# Patient Record
Sex: MALE | Race: WHITE | Employment: UNEMPLOYED | URBAN - METROPOLITAN AREA
[De-identification: names, ages, dates, MRNs, and addresses within clinical notes are randomized per-mention and may not be internally consistent; named-entity substitution may affect disease eponyms.]

---

## 2025-04-16 ENCOUNTER — APPOINTMENT (OUTPATIENT)
Dept: CARDIOLOGY | Facility: HOSPITAL | Age: 25
End: 2025-04-16
Payer: COMMERCIAL

## 2025-04-16 ENCOUNTER — HOSPITAL ENCOUNTER (EMERGENCY)
Dept: CARDIOLOGY | Facility: HOSPITAL | Age: 25
Discharge: HOME | End: 2025-04-16
Payer: COMMERCIAL

## 2025-04-16 ENCOUNTER — HOSPITAL ENCOUNTER (EMERGENCY)
Facility: HOSPITAL | Age: 25
Discharge: HOME | End: 2025-04-16
Attending: STUDENT IN AN ORGANIZED HEALTH CARE EDUCATION/TRAINING PROGRAM
Payer: COMMERCIAL

## 2025-04-16 VITALS
BODY MASS INDEX: 23.7 KG/M2 | HEIGHT: 69 IN | RESPIRATION RATE: 18 BRPM | HEART RATE: 80 BPM | TEMPERATURE: 96.8 F | SYSTOLIC BLOOD PRESSURE: 114 MMHG | DIASTOLIC BLOOD PRESSURE: 68 MMHG | WEIGHT: 160 LBS | OXYGEN SATURATION: 95 %

## 2025-04-16 DIAGNOSIS — G40.919 BREAKTHROUGH SEIZURE (MULTI): ICD-10-CM

## 2025-04-16 DIAGNOSIS — G40.919 BREAKTHROUGH SEIZURE (MULTI): Primary | ICD-10-CM

## 2025-04-16 LAB
ALBUMIN SERPL BCP-MCNC: 4.5 G/DL (ref 3.4–5)
ALP SERPL-CCNC: 68 U/L (ref 33–120)
ALT SERPL W P-5'-P-CCNC: 21 U/L (ref 10–52)
ANION GAP SERPL CALC-SCNC: 13 MMOL/L (ref 10–20)
AST SERPL W P-5'-P-CCNC: 20 U/L (ref 9–39)
BASOPHILS # BLD AUTO: 0.07 X10*3/UL (ref 0–0.1)
BASOPHILS NFR BLD AUTO: 1.1 %
BILIRUB SERPL-MCNC: 0.3 MG/DL (ref 0–1.2)
BUN SERPL-MCNC: 19 MG/DL (ref 6–23)
CALCIUM SERPL-MCNC: 9.1 MG/DL (ref 8.6–10.3)
CHLORIDE SERPL-SCNC: 109 MMOL/L (ref 98–107)
CO2 SERPL-SCNC: 19 MMOL/L (ref 21–32)
CREAT SERPL-MCNC: 1.04 MG/DL (ref 0.5–1.3)
EGFRCR SERPLBLD CKD-EPI 2021: >90 ML/MIN/1.73M*2
EOSINOPHIL # BLD AUTO: 0.35 X10*3/UL (ref 0–0.7)
EOSINOPHIL NFR BLD AUTO: 5.4 %
ERYTHROCYTE [DISTWIDTH] IN BLOOD BY AUTOMATED COUNT: 12.6 % (ref 11.5–14.5)
GLUCOSE SERPL-MCNC: 86 MG/DL (ref 74–99)
HCT VFR BLD AUTO: 42.8 % (ref 41–52)
HGB BLD-MCNC: 14 G/DL (ref 13.5–17.5)
IMM GRANULOCYTES # BLD AUTO: 0.04 X10*3/UL (ref 0–0.7)
IMM GRANULOCYTES NFR BLD AUTO: 0.6 % (ref 0–0.9)
LYMPHOCYTES # BLD AUTO: 2.09 X10*3/UL (ref 1.2–4.8)
LYMPHOCYTES NFR BLD AUTO: 32.1 %
MAGNESIUM SERPL-MCNC: 2.25 MG/DL (ref 1.6–2.4)
MCH RBC QN AUTO: 32.1 PG (ref 26–34)
MCHC RBC AUTO-ENTMCNC: 32.7 G/DL (ref 32–36)
MCV RBC AUTO: 98 FL (ref 80–100)
MONOCYTES # BLD AUTO: 0.76 X10*3/UL (ref 0.1–1)
MONOCYTES NFR BLD AUTO: 11.7 %
NEUTROPHILS # BLD AUTO: 3.2 X10*3/UL (ref 1.2–7.7)
NEUTROPHILS NFR BLD AUTO: 49.1 %
NRBC BLD-RTO: 0 /100 WBCS (ref 0–0)
PLATELET # BLD AUTO: 257 X10*3/UL (ref 150–450)
POTASSIUM SERPL-SCNC: 4.3 MMOL/L (ref 3.5–5.3)
PROT SERPL-MCNC: 6.7 G/DL (ref 6.4–8.2)
RBC # BLD AUTO: 4.36 X10*6/UL (ref 4.5–5.9)
SODIUM SERPL-SCNC: 137 MMOL/L (ref 136–145)
WBC # BLD AUTO: 6.5 X10*3/UL (ref 4.4–11.3)

## 2025-04-16 PROCEDURE — 2500000004 HC RX 250 GENERAL PHARMACY W/ HCPCS (ALT 636 FOR OP/ED): Mod: JZ | Performed by: STUDENT IN AN ORGANIZED HEALTH CARE EDUCATION/TRAINING PROGRAM

## 2025-04-16 PROCEDURE — 93005 ELECTROCARDIOGRAM TRACING: CPT

## 2025-04-16 PROCEDURE — 83735 ASSAY OF MAGNESIUM: CPT | Performed by: STUDENT IN AN ORGANIZED HEALTH CARE EDUCATION/TRAINING PROGRAM

## 2025-04-16 PROCEDURE — 36415 COLL VENOUS BLD VENIPUNCTURE: CPT | Performed by: STUDENT IN AN ORGANIZED HEALTH CARE EDUCATION/TRAINING PROGRAM

## 2025-04-16 PROCEDURE — 96360 HYDRATION IV INFUSION INIT: CPT

## 2025-04-16 PROCEDURE — 99284 EMERGENCY DEPT VISIT MOD MDM: CPT | Mod: 25 | Performed by: STUDENT IN AN ORGANIZED HEALTH CARE EDUCATION/TRAINING PROGRAM

## 2025-04-16 PROCEDURE — 85025 COMPLETE CBC W/AUTO DIFF WBC: CPT | Performed by: STUDENT IN AN ORGANIZED HEALTH CARE EDUCATION/TRAINING PROGRAM

## 2025-04-16 PROCEDURE — 80053 COMPREHEN METABOLIC PANEL: CPT | Performed by: STUDENT IN AN ORGANIZED HEALTH CARE EDUCATION/TRAINING PROGRAM

## 2025-04-16 RX ADMIN — SODIUM CHLORIDE 1000 ML: 0.9 INJECTION, SOLUTION INTRAVENOUS at 18:47

## 2025-04-16 ASSESSMENT — LIFESTYLE VARIABLES
HAVE YOU EVER FELT YOU SHOULD CUT DOWN ON YOUR DRINKING: NO
EVER HAD A DRINK FIRST THING IN THE MORNING TO STEADY YOUR NERVES TO GET RID OF A HANGOVER: NO
TOTAL SCORE: 0
EVER FELT BAD OR GUILTY ABOUT YOUR DRINKING: NO
HAVE PEOPLE ANNOYED YOU BY CRITICIZING YOUR DRINKING: NO

## 2025-04-16 NOTE — ED TRIAGE NOTES
Patient BIBA from home for seizure-like activity. 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 EMS states per family report seizure lasted  roughly was 45 seconds. His family gave him two doses of midazolam at home. Patient present postictal on arrival, delayed responses, but answers questions appropriately.    not applicable/with patient

## 2025-04-17 LAB
ATRIAL RATE: 75 BPM
ATRIAL RATE: 80 BPM
P AXIS: 64 DEGREES
P AXIS: 73 DEGREES
PR INTERVAL: 162 MS
PR INTERVAL: 164 MS
Q ONSET: 249 MS
Q ONSET: 252 MS
QRS COUNT: 12 BEATS
QRS COUNT: 13 BEATS
QRS DURATION: 101 MS
QRS DURATION: 106 MS
QT INTERVAL: 359 MS
QT INTERVAL: 370 MS
QTC CALCULATION(BAZETT): 411 MS
QTC CALCULATION(BAZETT): 415 MS
QTC FREDERICIA: 395 MS
QTC FREDERICIA: 397 MS
R AXIS: 76 DEGREES
R AXIS: 80 DEGREES
T AXIS: 34 DEGREES
T AXIS: 34 DEGREES
T OFFSET: 429 MS
T OFFSET: 437 MS
VENTRICULAR RATE: 74 BPM
VENTRICULAR RATE: 80 BPM

## 2025-04-17 PROCEDURE — 93005 ELECTROCARDIOGRAM TRACING: CPT

## 2025-04-17 NOTE — ED PROVIDER NOTES
HPI   Chief Complaint   Patient presents with    Seizures       26 y/o M comes in with seizures and complaint with his home medications comes in via EMS after a seizure at home that was witnessed by family/friend. The seizure lasted approx 1.5 min and the patients family gave him 2mg of intranasal versed. By the time EMS arrived there he was postictal. Accucheck normal per EMS. No medications given by EMS. Pt is alert and oriented upon arrival to the Ed and only complains of generalized weakness. Pt denied any head trauma, fever, cough, congestions, CP, SOB, abd pain, dizziness, numbness, tingling, HA, N/V/D.              Patient History   Medical History[1]  Surgical History[2]  Family History[3]  Social History[4]    Physical Exam   ED Triage Vitals   Temperature Heart Rate Respirations BP   04/16/25 2043 04/16/25 1806 04/16/25 1900 04/16/25 1806   36 °C (96.8 °F) (!) 103 (!) 25 115/68      Pulse Ox Temp Source Heart Rate Source Patient Position   04/16/25 1806 04/16/25 1806 04/16/25 1806 04/16/25 1806   (!) 91 % Temporal Monitor Lying      BP Location FiO2 (%)     04/16/25 1806 --     Left arm        Physical Exam  Vitals and nursing note reviewed.   Constitutional:       General: He is not in acute distress.     Appearance: Normal appearance. He is not ill-appearing or toxic-appearing.   HENT:      Head: Atraumatic.      Nose: Nose normal.      Mouth/Throat:      Mouth: Mucous membranes are moist.   Eyes:      Extraocular Movements: Extraocular movements intact.      Conjunctiva/sclera: Conjunctivae normal.      Pupils: Pupils are equal, round, and reactive to light.   Cardiovascular:      Rate and Rhythm: Normal rate and regular rhythm.      Pulses: Normal pulses.   Pulmonary:      Effort: Pulmonary effort is normal.      Breath sounds: Normal breath sounds.   Abdominal:      General: Abdomen is flat. Bowel sounds are normal.      Palpations: Abdomen is soft.   Musculoskeletal:         General: No swelling or  deformity. Normal range of motion.      Cervical back: Normal range of motion and neck supple. No rigidity.   Skin:     General: Skin is warm.      Capillary Refill: Capillary refill takes less than 2 seconds.   Neurological:      General: No focal deficit present.      Mental Status: He is alert and oriented to person, place, and time. Mental status is at baseline.      Cranial Nerves: No cranial nerve deficit.      Sensory: No sensory deficit.      Motor: No weakness.      Coordination: Coordination normal.      Deep Tendon Reflexes: Reflexes normal.   Psychiatric:         Mood and Affect: Mood normal.         Labs Reviewed   CBC WITH AUTO DIFFERENTIAL - Abnormal       Result Value    WBC 6.5      nRBC 0.0      RBC 4.36 (*)     Hemoglobin 14.0      Hematocrit 42.8      MCV 98      MCH 32.1      MCHC 32.7      RDW 12.6      Platelets 257      Neutrophils % 49.1      Immature Granulocytes %, Automated 0.6      Lymphocytes % 32.1      Monocytes % 11.7      Eosinophils % 5.4      Basophils % 1.1      Neutrophils Absolute 3.20      Immature Granulocytes Absolute, Automated 0.04      Lymphocytes Absolute 2.09      Monocytes Absolute 0.76      Eosinophils Absolute 0.35      Basophils Absolute 0.07     COMPREHENSIVE METABOLIC PANEL - Abnormal    Glucose 86      Sodium 137      Potassium 4.3      Chloride 109 (*)     Bicarbonate 19 (*)     Anion Gap 13      Urea Nitrogen 19      Creatinine 1.04      eGFR >90      Calcium 9.1      Albumin 4.5      Alkaline Phosphatase 68      Total Protein 6.7      AST 20      Bilirubin, Total 0.3      ALT 21     MAGNESIUM - Normal    Magnesium 2.25        No orders to display      Medications   sodium chloride 0.9 % bolus 1,000 mL (0 mL intravenous Stopped 4/16/25 1947)      ED Course & MDM   ED Course as of 04/17/25 0026   Wed Apr 16, 2025 1859 EKG completed.  Ventricular rate of 80.  Sinus rhythm.  No tracing Crees.  Normal LA interval.  No axis deviation.  No STEMI. [AM]   1943 EKG  completed.  Ventricular rate of 74.  Sinus rhythm.  No axis deviation.  No QRS widening.  Normal intervals.  No STEMI [AM]   2116 Results were discussed with patient and family at bedside.  Patient is feeling much better and has had no other seizure episodes while in the emergency department.  Patient feels ready to go home and outpatient follow-up with PCP and neurologist.  Strict return precautions were provided to him.  Family member will take him home.  All of her questions were answered. [AM]      ED Course User Index  [AM] Sweta Mosquera MD         Diagnoses as of 04/17/25 0026   Breakthrough seizure (Multi)                 No data recorded     [place] Coma Scale Score: 15 (04/16/25 1855 : Ailyn Hogan)                           Medical Decision Making      Procedure  Procedures         [1]   Past Medical History:  Diagnosis Date    Personal history of other mental and behavioral disorders     History of attention deficit disorder    Personal history of other mental and behavioral disorders     History of anxiety    Personal history of other specified conditions     History of Alexsander de la Tourette's syndrome   [2]   Past Surgical History:  Procedure Laterality Date    OTHER SURGICAL HISTORY  02/05/2020    Meniscus repair   [3] No family history on file.  [4]   Social History  Tobacco Use    Smoking status: Not on file    Smokeless tobacco: Not on file   Substance Use Topics    Alcohol use: Not on file    Drug use: Not on file        Sweta Mosquera MD  04/17/25 0026